# Patient Record
(demographics unavailable — no encounter records)

---
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
## 2019-11-11 NOTE — CR
INDICATION:



Patient fell; pain left shoulder.



COMPARISON:



None.



TECHNIQUE:



Single AP radiograph left shoulder.



FINDINGS:



No evidence of fracture. No abnormal calcifications identified. No other 

abnormalities are identified.



IMPRESSION:



Negative single AP radiograph left shoulder.



Dictated by Nichole Mays MD @ Nov 11 2019  9:24AM



Signed by Dr. Nichole Mays @ Nov 11 2019  9:26AM
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
## 2019-12-13 NOTE — CR
Sacrum and coccyx: Two views of the sacrum and coccyx were obtained.



Sacroiliac joints appear within normal limits.  No discrete fracture or other 
abnormality is seen.



Impression:  No discrete abnormality is seen on two-view sacrum and coccyx 
study.



Diagnostic code #2



This report was dictated in [place] Standard Time
MTDD

## 2019-12-13 NOTE — CR
Left shoulder: Three views of the left shoulder were obtained.



Comparison: Previous left shoulder study of 11/11/19.



Acromioclavicular joint appears normal.  Glenohumeral joint is felt to show 
mild degenerative change.  No acute fracture, dislocation or other bony 
abnormality is seen.



Impression:

1.  Mild degenerative change suggested to the glenohumeral joint.

2.  Left shoulder study is otherwise unremarkable.



Diagnostic code #2



This report was dictated in [place] Standard Time
MTDD